# Patient Record
Sex: FEMALE | Race: WHITE
[De-identification: names, ages, dates, MRNs, and addresses within clinical notes are randomized per-mention and may not be internally consistent; named-entity substitution may affect disease eponyms.]

---

## 2018-10-23 ENCOUNTER — HOSPITAL ENCOUNTER (OUTPATIENT)
Dept: HOSPITAL 19 - SDCO | Age: 43
Discharge: HOME | End: 2018-10-23
Attending: INTERNAL MEDICINE
Payer: OTHER GOVERNMENT

## 2018-10-23 VITALS — SYSTOLIC BLOOD PRESSURE: 109 MMHG | DIASTOLIC BLOOD PRESSURE: 72 MMHG | HEART RATE: 73 BPM

## 2018-10-23 VITALS — SYSTOLIC BLOOD PRESSURE: 113 MMHG | DIASTOLIC BLOOD PRESSURE: 64 MMHG | HEART RATE: 73 BPM

## 2018-10-23 VITALS — SYSTOLIC BLOOD PRESSURE: 117 MMHG | HEART RATE: 68 BPM | DIASTOLIC BLOOD PRESSURE: 82 MMHG

## 2018-10-23 VITALS — BODY MASS INDEX: 39.08 KG/M2 | WEIGHT: 234.57 LBS | HEIGHT: 65 IN

## 2018-10-23 VITALS — DIASTOLIC BLOOD PRESSURE: 71 MMHG | HEART RATE: 71 BPM | SYSTOLIC BLOOD PRESSURE: 111 MMHG

## 2018-10-23 VITALS — SYSTOLIC BLOOD PRESSURE: 117 MMHG | HEART RATE: 78 BPM | DIASTOLIC BLOOD PRESSURE: 70 MMHG

## 2018-10-23 VITALS — TEMPERATURE: 98.6 F | DIASTOLIC BLOOD PRESSURE: 76 MMHG | HEART RATE: 65 BPM | SYSTOLIC BLOOD PRESSURE: 116 MMHG

## 2018-10-23 VITALS — SYSTOLIC BLOOD PRESSURE: 113 MMHG | HEART RATE: 79 BPM | DIASTOLIC BLOOD PRESSURE: 80 MMHG

## 2018-10-23 DIAGNOSIS — Z90.49: ICD-10-CM

## 2018-10-23 DIAGNOSIS — Z98.84: ICD-10-CM

## 2018-10-23 DIAGNOSIS — K64.0: ICD-10-CM

## 2018-10-23 DIAGNOSIS — K57.30: Primary | ICD-10-CM

## 2018-10-23 DIAGNOSIS — R19.7: ICD-10-CM

## 2022-03-29 ENCOUNTER — HOSPITAL ENCOUNTER (OUTPATIENT)
Dept: HOSPITAL 19 - MHCPAIN | Age: 47
End: 2022-03-29
Attending: ANESTHESIOLOGY
Payer: OTHER GOVERNMENT

## 2022-03-29 DIAGNOSIS — M53.3: ICD-10-CM

## 2022-03-29 DIAGNOSIS — M47.816: Primary | ICD-10-CM

## 2022-03-29 DIAGNOSIS — M54.16: ICD-10-CM

## 2022-03-29 PROCEDURE — G0463 HOSPITAL OUTPT CLINIC VISIT: HCPCS

## 2022-04-14 ENCOUNTER — HOSPITAL ENCOUNTER (OUTPATIENT)
Dept: HOSPITAL 19 - MHCPAIN | Age: 47
End: 2022-04-14
Attending: ANESTHESIOLOGY
Payer: OTHER GOVERNMENT

## 2022-04-14 DIAGNOSIS — M54.16: ICD-10-CM

## 2022-04-14 DIAGNOSIS — M53.3: ICD-10-CM

## 2022-04-14 DIAGNOSIS — M47.817: Primary | ICD-10-CM

## 2022-10-14 ENCOUNTER — HOSPITAL ENCOUNTER (OUTPATIENT)
Dept: HOSPITAL 19 - SDCO | Age: 47
Discharge: HOME | End: 2022-10-14
Attending: INTERNAL MEDICINE
Payer: OTHER GOVERNMENT

## 2022-10-14 VITALS — DIASTOLIC BLOOD PRESSURE: 84 MMHG | SYSTOLIC BLOOD PRESSURE: 120 MMHG | HEART RATE: 65 BPM

## 2022-10-14 VITALS — SYSTOLIC BLOOD PRESSURE: 116 MMHG | HEART RATE: 65 BPM | DIASTOLIC BLOOD PRESSURE: 78 MMHG | TEMPERATURE: 97.8 F

## 2022-10-14 VITALS — HEIGHT: 65 IN | WEIGHT: 251.11 LBS | BODY MASS INDEX: 41.84 KG/M2

## 2022-10-14 VITALS — DIASTOLIC BLOOD PRESSURE: 68 MMHG | SYSTOLIC BLOOD PRESSURE: 106 MMHG | HEART RATE: 59 BPM

## 2022-10-14 VITALS — DIASTOLIC BLOOD PRESSURE: 78 MMHG | SYSTOLIC BLOOD PRESSURE: 126 MMHG | HEART RATE: 60 BPM

## 2022-10-14 VITALS — HEART RATE: 82 BPM | DIASTOLIC BLOOD PRESSURE: 77 MMHG | SYSTOLIC BLOOD PRESSURE: 119 MMHG

## 2022-10-14 VITALS — SYSTOLIC BLOOD PRESSURE: 125 MMHG | DIASTOLIC BLOOD PRESSURE: 72 MMHG | HEART RATE: 62 BPM

## 2022-10-14 DIAGNOSIS — Z98.84: ICD-10-CM

## 2022-10-14 DIAGNOSIS — K44.9: ICD-10-CM

## 2022-10-14 DIAGNOSIS — R19.7: ICD-10-CM

## 2022-10-14 DIAGNOSIS — K31.7: ICD-10-CM

## 2022-10-14 DIAGNOSIS — K29.50: Primary | ICD-10-CM

## 2022-10-14 DIAGNOSIS — K21.00: ICD-10-CM

## 2022-10-14 DIAGNOSIS — R19.5: ICD-10-CM

## 2022-10-14 PROCEDURE — C9113 INJ PANTOPRAZOLE SODIUM, VIA: HCPCS

## 2022-10-14 NOTE — NUR
INT DISCONTINUED AND SITE IS FREE OF REDNESS.  GIVEN DISMISSAL INSTRUCTIONS.
STATES THAT SHE IS FEELING BETTER AFTER HAVING IV PROTONIX AND PO MAALOX.
DISMISSED TO HOME  PER PRIVATE VEHICLE  DRIVEN BY SPOUSE.  DISMISSAL
INSTRUCTIONS IN HAND.

## 2022-10-14 NOTE — NUR
RECEIVED REFERENCE # FOR CT SCAN FROM PATIENT REPRESENTATIVE FROM [facility] AND
DR. HIDALGO WAS NOTIFIED AND ORDER PLACED.

## 2022-10-14 NOTE — NUR
RECEIVED REFERENCE # FOR CT SCAN FROM PATIENT REPRESENTATIVE FROM [facility] AND
DR. HIDALGO WAS NOTIFIED AND ORDER PLACED.

## 2022-10-14 NOTE — NUR
IV TO INT AND PATIENT HAS BEEN AMBULATING TO THE BATHROOM.  GAIT STEADY.
KEEPS WARM BLANKET ON ABDOMEN.

## 2022-10-14 NOTE — NUR
DR. HIDALGO IN THE ROOM.  KEEP PATIENT NPO AND DR. HIDALGO'S OFFICE WILL GET
AUTHORIZATION FOR CT SCAN NOW.

## 2022-10-14 NOTE — NUR
PATIENT RETURNS TO BAY 1 AND IS COMPLAINING OF EPIGASTRIC DISCOMFORT AND
ABDOMINAL DISCOMFORT.  IV FLUIDS INFUSING.  SPOUSE IN ROOM.  TEMP 98.1  AND
ROOM AIR SATS 96%.  LEANS FORWARD IN CHAIR AND WARM BLANKET APPLIED TO
STOMACH.  WILL CONTINUE TO MONITOR.